# Patient Record
Sex: FEMALE | Race: BLACK OR AFRICAN AMERICAN | Employment: OTHER | URBAN - METROPOLITAN AREA
[De-identification: names, ages, dates, MRNs, and addresses within clinical notes are randomized per-mention and may not be internally consistent; named-entity substitution may affect disease eponyms.]

---

## 2021-12-29 ENCOUNTER — APPOINTMENT (OUTPATIENT)
Dept: ULTRASOUND IMAGING | Age: 46
End: 2021-12-29
Payer: MEDICAID

## 2021-12-29 ENCOUNTER — HOSPITAL ENCOUNTER (EMERGENCY)
Age: 46
Discharge: HOME | End: 2021-12-29
Attending: EMERGENCY MEDICINE
Payer: MEDICAID

## 2021-12-29 VITALS
DIASTOLIC BLOOD PRESSURE: 57 MMHG | WEIGHT: 293 LBS | BODY MASS INDEX: 72.55 KG/M2 | OXYGEN SATURATION: 94 % | TEMPERATURE: 98.2 F | HEART RATE: 101 BPM | RESPIRATION RATE: 20 BRPM | SYSTOLIC BLOOD PRESSURE: 118 MMHG

## 2021-12-29 DIAGNOSIS — R60.9 PERIPHERAL EDEMA: Primary | ICD-10-CM

## 2021-12-29 DIAGNOSIS — I10 ESSENTIAL HYPERTENSION: ICD-10-CM

## 2021-12-29 LAB
ALBUMIN SERPL-MCNC: 3.1 G/DL (ref 3.5–5.2)
ALP BLD-CCNC: 136 U/L (ref 35–104)
ALT SERPL-CCNC: 86 U/L (ref 0–32)
ANION GAP SERPL CALCULATED.3IONS-SCNC: 10 MMOL/L (ref 7–16)
ANISOCYTOSIS: ABNORMAL
AST SERPL-CCNC: 46 U/L (ref 0–31)
BACTERIA: ABNORMAL /HPF
BASOPHILS ABSOLUTE: 0 E9/L (ref 0–0.2)
BASOPHILS RELATIVE PERCENT: 0 % (ref 0–2)
BILIRUB SERPL-MCNC: 1.2 MG/DL (ref 0–1.2)
BILIRUBIN URINE: NEGATIVE
BLOOD, URINE: ABNORMAL
BUN BLDV-MCNC: <2 MG/DL (ref 6–20)
CALCIUM SERPL-MCNC: 8.8 MG/DL (ref 8.6–10.2)
CHLORIDE BLD-SCNC: 90 MMOL/L (ref 98–107)
CLARITY: CLEAR
CO2: 35 MMOL/L (ref 22–29)
COLOR: YELLOW
CREAT SERPL-MCNC: 0.5 MG/DL (ref 0.5–1)
EOSINOPHILS ABSOLUTE: 0 E9/L (ref 0.05–0.5)
EOSINOPHILS RELATIVE PERCENT: 0 % (ref 0–6)
EPITHELIAL CELLS, UA: ABNORMAL /HPF
GFR AFRICAN AMERICAN: >60
GFR NON-AFRICAN AMERICAN: >60 ML/MIN/1.73
GLUCOSE BLD-MCNC: 109 MG/DL (ref 74–99)
GLUCOSE URINE: NEGATIVE MG/DL
HCT VFR BLD CALC: 47.6 % (ref 34–48)
HEMOGLOBIN: 16.1 G/DL (ref 11.5–15.5)
KETONES, URINE: NEGATIVE MG/DL
LEUKOCYTE ESTERASE, URINE: NEGATIVE
LYMPHOCYTES ABSOLUTE: 1.89 E9/L (ref 1.5–4)
LYMPHOCYTES RELATIVE PERCENT: 31 % (ref 20–42)
MAGNESIUM: 1.8 MG/DL (ref 1.6–2.6)
MCH RBC QN AUTO: 41.4 PG (ref 26–35)
MCHC RBC AUTO-ENTMCNC: 33.8 % (ref 32–34.5)
MCV RBC AUTO: 122.4 FL (ref 80–99.9)
MONOCYTES ABSOLUTE: 0.61 E9/L (ref 0.1–0.95)
MONOCYTES RELATIVE PERCENT: 10 % (ref 2–12)
NEUTROPHILS ABSOLUTE: 3.6 E9/L (ref 1.8–7.3)
NEUTROPHILS RELATIVE PERCENT: 59 % (ref 43–80)
NITRITE, URINE: NEGATIVE
NUCLEATED RED BLOOD CELLS: 1 /100 WBC
PDW BLD-RTO: 17 FL (ref 11.5–15)
PH UA: 6 (ref 5–9)
PLATELET # BLD: 191 E9/L (ref 130–450)
PMV BLD AUTO: 8.8 FL (ref 7–12)
POIKILOCYTES: ABNORMAL
POLYCHROMASIA: ABNORMAL
POTASSIUM REFLEX MAGNESIUM: 3.4 MMOL/L (ref 3.5–5)
PRO-BNP: 599 PG/ML (ref 0–125)
PROTEIN UA: NEGATIVE MG/DL
RBC # BLD: 3.89 E12/L (ref 3.5–5.5)
RBC UA: ABNORMAL /HPF (ref 0–2)
SMUDGE CELLS: ABNORMAL
SODIUM BLD-SCNC: 135 MMOL/L (ref 132–146)
SPECIFIC GRAVITY UA: <=1.005 (ref 1–1.03)
TEAR DROP CELLS: ABNORMAL
TOTAL PROTEIN: 7.1 G/DL (ref 6.4–8.3)
UROBILINOGEN, URINE: 0.2 E.U./DL
WBC # BLD: 6.1 E9/L (ref 4.5–11.5)
WBC UA: ABNORMAL /HPF (ref 0–5)

## 2021-12-29 PROCEDURE — 80053 COMPREHEN METABOLIC PANEL: CPT

## 2021-12-29 PROCEDURE — 99284 EMERGENCY DEPT VISIT MOD MDM: CPT

## 2021-12-29 PROCEDURE — 83880 ASSAY OF NATRIURETIC PEPTIDE: CPT

## 2021-12-29 PROCEDURE — 81001 URINALYSIS AUTO W/SCOPE: CPT

## 2021-12-29 PROCEDURE — 83735 ASSAY OF MAGNESIUM: CPT

## 2021-12-29 PROCEDURE — 93970 EXTREMITY STUDY: CPT

## 2021-12-29 PROCEDURE — 85025 COMPLETE CBC W/AUTO DIFF WBC: CPT

## 2021-12-29 RX ORDER — FUROSEMIDE 20 MG/1
20 TABLET ORAL DAILY
Qty: 30 TABLET | Refills: 3 | Status: SHIPPED | OUTPATIENT
Start: 2021-12-29

## 2021-12-29 ASSESSMENT — ENCOUNTER SYMPTOMS
COUGH: 0
DIARRHEA: 0
SORE THROAT: 0
TROUBLE SWALLOWING: 0
SINUS PAIN: 0
RHINORRHEA: 0
WHEEZING: 0
ABDOMINAL DISTENTION: 1
SHORTNESS OF BREATH: 0
VOMITING: 0
ABDOMINAL PAIN: 0
EYE PAIN: 0
BACK PAIN: 0
NAUSEA: 0

## 2021-12-29 NOTE — ED PROVIDER NOTES
56 YO female pt with morbid obesity presents today to the ED via EMS from home with complaints of leg swelling onset 2 years ago progressively getting worse over the past couple days. Patient symptoms are severe, constant and have been progressively getting worse. She states that she was seen by her PCP 2 years ago who prescribed her several medications that did not work and were discontinued. She states that for the past couple days the swelling has been getting worse and she has been unable to ambulate around her house due to the swelling. She denies any associated shortness of breath. She has not had any chest pain, syncopal episodes or falls. Review of Systems   Constitutional: Negative for diaphoresis and fever. HENT: Negative for ear pain, hearing loss, rhinorrhea, sinus pain, sore throat and trouble swallowing. Eyes: Negative for pain. Respiratory: Negative for cough, shortness of breath and wheezing. Cardiovascular: Positive for leg swelling. Negative for chest pain and palpitations. Gastrointestinal: Positive for abdominal distention. Negative for abdominal pain, diarrhea, nausea and vomiting. Endocrine: Negative for polyuria. Genitourinary: Negative for flank pain, frequency, hematuria and urgency. Musculoskeletal: Negative for back pain, neck pain and neck stiffness. Neurological: Negative for dizziness, speech difficulty, weakness, light-headedness and numbness. Psychiatric/Behavioral: Negative for confusion. The patient is not nervous/anxious. Physical Exam  Constitutional:       General: She is not in acute distress. Appearance: Normal appearance. She is not ill-appearing, toxic-appearing or diaphoretic. HENT:      Head: Normocephalic and atraumatic. Nose: No rhinorrhea. Eyes:      General: No scleral icterus. Extraocular Movements: Extraocular movements intact. Pupils: Pupils are equal, round, and reactive to light.    Cardiovascular: Heart sounds: Normal heart sounds. No murmur heard. No friction rub. No gallop. Pulmonary:      Breath sounds: Normal breath sounds. No wheezing, rhonchi or rales. Abdominal:      Palpations: Abdomen is soft. Tenderness: There is no abdominal tenderness. Musculoskeletal:         General: No swelling. Cervical back: Normal range of motion. No rigidity or tenderness. Right lower leg: Swelling present. No deformity, lacerations, tenderness or bony tenderness. Edema present. Left lower leg: Swelling present. No deformity, lacerations, tenderness or bony tenderness. Edema present. Lymphadenopathy:      Cervical: No cervical adenopathy. Skin:     General: Skin is warm and dry. Coloration: Skin is not jaundiced. Neurological:      General: No focal deficit present. Mental Status: She is alert and oriented to person, place, and time. Cranial Nerves: No cranial nerve deficit. Sensory: No sensory deficit. Motor: No weakness. Psychiatric:         Mood and Affect: Mood normal.         Behavior: Behavior normal.         Thought Content: Thought content normal.         Judgment: Judgment normal.          Procedures     MDM  Number of Diagnoses or Management Options  Essential hypertension  Peripheral edema  Diagnosis management comments: This is a 70-year-old female who presents today to the emergency department via EMS with complaints of bilateral leg swelling onset 2 years ago. Patient states that her symptoms have been persistent and progressively getting worse. Patient admits to not leaving her house over the past year at all secondary to difficulty ambulating. She has not seen a PCP since 2017. Patient has no medical problems and does not take any daily medications. She states that she was previously put on several medications for the swelling but her PCP instructed her to discontinue taking them as they were not working.   Patient is morbidly obese and states that she has been actively trying to lose weight but has been unsuccessful. Patient believes that her body is filled with fluid and that is why she is having all these problems. Patient lives at home with her 2 children. On arrival to the emergency department patient is nontoxic, no acute distress. Vital signs are reviewed and within normal limits. Patient is unable to ambulate to the bathroom secondary to morbid obesity. A Vick catheter was placed. Patient states that she has difficulty ambulating to the bathroom at home. Discussed the case with Jennifer Lane . He agreed to provide the patient with arrangements to set up home health. A bedside commode was ordered for the patient at home as well as a hospital bed. Patient was given referral to PCP. A work-up was ordered today in the emergency department which included a CBC, CMP, BNP as well as bilateral lower extremity ultrasound. Work-up was remarkable for a BNP of 599. Patient is denying any shortness of breath. She is maintaining her oxygen saturation above 90. Discussed with the patient that she will be started on furosemide which will help with her edema. Patient and her daughter are insisting that she is given something that will help remove the water weight immediately. Explained to them that the patient's symptoms are all chronic in nature and it is unlikely that they will resolve spontaneously. From an emergency standpoint the patient is stable. She has been provided several arrangements for outpatient management. Had several conversations with the patient and her daughter about this. She was discharged from the emergency department. Return precautions were given. Social work is involved and has also had lengthy discussions with the patient about home health management. Patient will schedule an appointment with a PCP.        ED Course as of 12/29/21 1516   Wed Dec 29, 2021   1208   ATTENDING PROVIDER ATTESTATION:     I have personally performed and/or participated in the history, exam, medical decision making, and procedures and agree with all pertinent clinical information unless otherwise noted. I have also reviewed and agree with the past medical, family and social history unless otherwise noted. I have discussed this patient in detail with the resident and provided the instruction and education regarding the evidence-based evaluation and treatment of leg edema and weakness. History: patient presents with complaint of year long leg edema and increased disability. She states she has not been out of her home in a year when she last saw her doctor. She denies increased SOB or systemic symptoms. My findings: Arnold Villeda is a 55 y.o. female whom is in no distress. Physical exam reveals morbid obesity. Heart rate is slightly elevated and regular. Lungs CTA. Abdomen is soft and nontender. Diffuse chronic skin changes from edema. No open wounds. Mild erythema of the left anterior thigh. My plan: Symptomatic and supportive care. Will discuss with  and arrange for outpatient follow up and visiting physicians. Electronically signed by Angélica Chappell DO on 12/29/21 at 12:08 PM EST       [JS]   21 [phone] Had a lengthy discussion with patient and daughter. They had expectations that we would be able to take off the extra water weight immediately. They don't understand that the patient does not acutely meet criteria for admission however they are not interested in placement for rehabilitation. She confirms her symptoms have been ongoing for 2 years. We discussed prognosis and need for continued PCP follow up and have made arrangements with LSW for help at home.  [JS]      ED Course User Index  [CLARISSE] Angélica Chappell DO       ED Course as of 12/29/21 1531   Wed Dec 29, 2021   1208   ATTENDING PROVIDER ATTESTATION:     I have personally performed and/or participated in the history, exam, history that includes Eye surgery and eye surgery. Social History:  reports that she has been smoking cigars. She does not have any smokeless tobacco history on file. She reports current alcohol use. She reports current drug use. Drug: Marijuana [place]). Family History: family history is not on file. The patients home medications have been reviewed. Allergies: Patient has no known allergies.     -------------------------------------------------- RESULTS -------------------------------------------------  Labs:  Results for orders placed or performed during the hospital encounter of 12/29/21   CBC Auto Differential   Result Value Ref Range    WBC 6.1 4.5 - 11.5 E9/L    RBC 3.89 3.50 - 5.50 E12/L    Hemoglobin 16.1 (H) 11.5 - 15.5 g/dL    Hematocrit 47.6 34.0 - 48.0 %    .4 (H) 80.0 - 99.9 fL    MCH 41.4 (H) 26.0 - 35.0 pg    MCHC 33.8 32.0 - 34.5 %    RDW 17.0 (H) 11.5 - 15.0 fL    Platelets 202 067 - 606 E9/L    MPV 8.8 7.0 - 12.0 fL    Neutrophils % 59.0 43.0 - 80.0 %    Lymphocytes % 31.0 20.0 - 42.0 %    Monocytes % 10.0 2.0 - 12.0 %    Eosinophils % 0.0 0.0 - 6.0 %    Basophils % 0.0 0.0 - 2.0 %    Neutrophils Absolute 3.60 1.80 - 7.30 E9/L    Lymphocytes Absolute 1.89 1.50 - 4.00 E9/L    Monocytes Absolute 0.61 0.10 - 0.95 E9/L    Eosinophils Absolute 0.00 (L) 0.05 - 0.50 E9/L    Basophils Absolute 0.00 0.00 - 0.20 E9/L    nRBC 1.0 /100 WBC    Smudge Cells 1+     Anisocytosis 1+     Polychromasia 1+     Poikilocytes 1+     Tear Drop Cells 1+    Comprehensive Metabolic Panel w/ Reflex to MG   Result Value Ref Range    Sodium 135 132 - 146 mmol/L    Potassium reflex Magnesium 3.4 (L) 3.5 - 5.0 mmol/L    Chloride 90 (L) 98 - 107 mmol/L    CO2 35 (H) 22 - 29 mmol/L    Anion Gap 10 7 - 16 mmol/L    Glucose 109 (H) 74 - 99 mg/dL    BUN <2 (L) 6 - 20 mg/dL    CREATININE 0.5 0.5 - 1.0 mg/dL    GFR Non-African American >60 >=60 mL/min/1.73    GFR African American >60     Calcium 8.8 8.6 - 10.2 mg/dL Total Protein 7.1 6.4 - 8.3 g/dL    Albumin 3.1 (L) 3.5 - 5.2 g/dL    Total Bilirubin 1.2 0.0 - 1.2 mg/dL    Alkaline Phosphatase 136 (H) 35 - 104 U/L    ALT 86 (H) 0 - 32 U/L    AST 46 (H) 0 - 31 U/L   Brain Natriuretic Peptide   Result Value Ref Range    Pro- (H) 0 - 125 pg/mL   Magnesium   Result Value Ref Range    Magnesium 1.8 1.6 - 2.6 mg/dL   Urinalysis with Microscopic   Result Value Ref Range    Color, UA Yellow Straw/Yellow    Clarity, UA Clear Clear    Glucose, Ur Negative Negative mg/dL    Bilirubin Urine Negative Negative    Ketones, Urine Negative Negative mg/dL    Specific Gravity, UA <=1.005 1.005 - 1.030    Blood, Urine LARGE (A) Negative    pH, UA 6.0 5.0 - 9.0    Protein, UA Negative Negative mg/dL    Urobilinogen, Urine 0.2 <2.0 E.U./dL    Nitrite, Urine Negative Negative    Leukocyte Esterase, Urine Negative Negative    WBC, UA 1-3 0 - 5 /HPF    RBC, UA 0-1 0 - 2 /HPF    Epithelial Cells, UA FEW /HPF    Bacteria, UA RARE (A) None Seen /HPF       Radiology:  US DUP LOWER EXTREMITIES BILATERAL VENOUS   Final Result   No evidence of DVT in either lower extremity.             ------------------------- NURSING NOTES AND VITALS REVIEWED ---------------------------  Date / Time Roomed:  12/29/2021  9:01 AM  ED Bed Assignment:  HALL/[email or web address]    The nursing notes within the ED encounter and vital signs as below have been reviewed. BP (!) 118/57   Pulse 101   Temp 98.2 °F (36.8 °C) (Oral)   Resp 20   Wt (!) 436 lb (197.8 kg)   SpO2 94%   BMI 72.55 kg/m²   Oxygen Saturation Interpretation: Normal      ------------------------------------------ PROGRESS NOTES ------------------------------------------  3:31 PM EST  I have spoken with the patient and discussed todays results, in addition to providing specific details for the plan of care and counseling regarding the diagnosis and prognosis. Their questions are answered at this time and they are agreeable with the plan.  I discussed at length with them reasons for immediate return here for re evaluation. They will followup with their primary care physician by calling their office tomorrow. --------------------------------- ADDITIONAL PROVIDER NOTES ---------------------------------  At this time the patient is without objective evidence of an acute process requiring hospitalization or inpatient management. They have remained hemodynamically stable throughout their entire ED visit and are stable for discharge with outpatient follow-up. The plan has been discussed in detail and they are aware of the specific conditions for emergent return, as well as the importance of follow-up. New Prescriptions    FUROSEMIDE (LASIX) 20 MG TABLET    Take 1 tablet by mouth daily       Diagnosis:  1. Peripheral edema    2. Essential hypertension        Disposition:  Patient's disposition: Discharge to home  Patient's condition is stable.        Nissa Calero, DO  Resident  12/29/21 1212 [facility],   12/29/21 0178

## 2021-12-29 NOTE — CARE COORDINATION
SS Note: No Covid test. Pt presented for leg swelling. She is morbidly obese (5' 5'' & [id number]) and has not seen Physician for over 1 yr. Pt in need of VPA. SW met w/pt in ED 14 and spoke from door wearing mask/PPE and explained role. Pt's Dtr, Umair Jaswant @ bedside and pt gives permission to speak openly. Pt lives in 2 story home which has 3 step entry into house. Pt is single and her 20 yo Dtr and 24 yo son live w/her. She has bedroom and bath on 2nd floor and she has not been down stairs for over 1 yr. It took 6 firefighters to get her down the 12 steps to ambulance today. Pt notes she had PCP from [facility] but does not recall name and has not seen him for over a year but did talk to him on phone few months ago. He did prescribe a water pill, HBP pill and something for yeast infection but Pt not taking any meds for few months & Pharmacy is [facility]. PTA, pt is semi-independent in IADL's/ADL's and has insurance. She notes she is able to walk to bathroom but it has been getting difficult. Denies hx of HHC or [facility] & No hx of 02. SW notes if she is medically cleared can set her up w/VPA if she wants, as they can do HV's. Pt is amenable. TAVIA also provided [facility] list for PT/OT as option as well as information for [facility] ([address]) [address] [place] [address] via [facility]. Also provided list of agencies that can provide this if pt is eligible. Pt states she does not want to go to a [facility]. TAVIA also provided pt w/blank POA as she has 3 children & TAVIA noted per [place], [de-identified] of adult Children will make decision. Thus, pt may want to consider POA. Advance Care Planning   Healthcare Decision Maker:    Primary Decision Maker: Estefani Parker - Child - [phone]  Click here to complete Healthcare Decision Makers including selection of the Healthcare Decision Maker Relationship (ie \"Primary\").   Today we documented Decision Maker(s) consistent with Legal Next of Kin hierarchy. SW called [facility] & spoke to [facility] who explained Pt's PCP is Dr. Precious Bailey. Last in-person appointment was March 2021. SW called [facility]. She noted they are not contracted in [place] so cannot take pt. SW called [facility]. They are not taking pts. SW called [address] but they do not have Physicians to make house calls. Only other agency aware of that provides Physician to make house calls is [facility]. SW talked to pt & her Dtr about this and they are not interested in this @ this time. SW added it to AVS. SW also asked about HHC options. Pt not sure who she wants and not worried about HHC at this time. She wants to speak to doctor about her tests results. If she is d/c'd, pt not sure how she will cope at home as she will not be able to get back upstairs. SW voiced understanding and provided empathy. Resident in speaking to pt and reviewed all labs. Pt to be d/c'd home w/medication for lymphedema. Pt not happy- hopping ot be admitted. Long discussion about pt's case and that she is not requiring admission and SW asks again if she wants HHC. Pt not sure of agency. Her Dtr notes she has to go home and get house ready for pt. .. get bed from upstairs downstairs. SW asks if pt would need a hospital bed and she declines. SW asks about [facility] and pt would like this. SW completed referral to [facility] for geriatirc [facility]. Cannot be delivered until tomorrow. PAS set up for transport home. .. ETA [phone]. PAS here and pt still not sure what [facility]  agency she wants. She will call SW tomorrow.    Electronically signed by MINO Alejandra on 12/29/2021 at 6:53 PM